# Patient Record
Sex: FEMALE | Race: WHITE
[De-identification: names, ages, dates, MRNs, and addresses within clinical notes are randomized per-mention and may not be internally consistent; named-entity substitution may affect disease eponyms.]

---

## 2020-02-11 ENCOUNTER — HOSPITAL ENCOUNTER (EMERGENCY)
Dept: HOSPITAL 25 - ED | Age: 50
Discharge: HOME | End: 2020-02-11
Payer: SELF-PAY

## 2020-02-11 VITALS — SYSTOLIC BLOOD PRESSURE: 108 MMHG | DIASTOLIC BLOOD PRESSURE: 61 MMHG

## 2020-02-11 DIAGNOSIS — R51: Primary | ICD-10-CM

## 2020-02-11 DIAGNOSIS — I48.91: ICD-10-CM

## 2020-02-11 DIAGNOSIS — R07.9: ICD-10-CM

## 2020-02-11 DIAGNOSIS — R20.0: ICD-10-CM

## 2020-02-11 LAB
ALBUMIN SERPL BCG-MCNC: 4.3 G/DL (ref 3.2–5.2)
ALBUMIN/GLOB SERPL: 1.7 {RATIO} (ref 1–3)
ALP SERPL-CCNC: 53 U/L (ref 34–104)
ALT SERPL W P-5'-P-CCNC: 12 U/L (ref 7–52)
ANION GAP SERPL CALC-SCNC: 5 MMOL/L (ref 2–11)
AST SERPL-CCNC: 16 U/L (ref 13–39)
BASOPHILS # BLD AUTO: 0.1 10^3/UL (ref 0–0.2)
BUN SERPL-MCNC: 19 MG/DL (ref 6–24)
BUN/CREAT SERPL: 21.8 (ref 8–20)
CALCIUM SERPL-MCNC: 9.8 MG/DL (ref 8.6–10.3)
CHLORIDE SERPL-SCNC: 107 MMOL/L (ref 101–111)
EOSINOPHIL # BLD AUTO: 0.2 10^3/UL (ref 0–0.6)
GLOBULIN SER CALC-MCNC: 2.5 G/DL (ref 2–4)
GLUCOSE SERPL-MCNC: 91 MG/DL (ref 70–100)
HCO3 SERPL-SCNC: 28 MMOL/L (ref 22–32)
HCT VFR BLD AUTO: 41 % (ref 35–47)
HGB BLD-MCNC: 14.2 G/DL (ref 12–16)
INR PPP/BLD: 0.99 (ref 0.82–1.09)
LYMPHOCYTES # BLD AUTO: 3.2 10^3/UL (ref 1–4.8)
MCH RBC QN AUTO: 30 PG (ref 27–31)
MCHC RBC AUTO-ENTMCNC: 35 G/DL (ref 31–36)
MCV RBC AUTO: 86 FL (ref 80–97)
MONOCYTES # BLD AUTO: 0.6 10^3/UL (ref 0–0.8)
NEUTROPHILS # BLD AUTO: 4.4 10^3/UL (ref 1.5–7.7)
NRBC # BLD AUTO: 0 10^3/UL
NRBC BLD QL AUTO: 0.1
PLATELET # BLD AUTO: 322 10^3/UL (ref 150–450)
POTASSIUM SERPL-SCNC: 3.9 MMOL/L (ref 3.5–5)
PROT SERPL-MCNC: 6.8 G/DL (ref 6.4–8.9)
RBC # BLD AUTO: 4.74 10^6 /UL (ref 3.7–4.87)
SODIUM SERPL-SCNC: 140 MMOL/L (ref 135–145)
TROPONIN I SERPL-MCNC: 0 NG/ML (ref ?–0.03)
WBC # BLD AUTO: 8.5 10^3/UL (ref 3.5–10.8)

## 2020-02-11 PROCEDURE — 84484 ASSAY OF TROPONIN QUANT: CPT

## 2020-02-11 PROCEDURE — 83605 ASSAY OF LACTIC ACID: CPT

## 2020-02-11 PROCEDURE — G0480 DRUG TEST DEF 1-7 CLASSES: HCPCS

## 2020-02-11 PROCEDURE — 80320 DRUG SCREEN QUANTALCOHOLS: CPT

## 2020-02-11 PROCEDURE — 85610 PROTHROMBIN TIME: CPT

## 2020-02-11 PROCEDURE — 93005 ELECTROCARDIOGRAM TRACING: CPT

## 2020-02-11 PROCEDURE — 36415 COLL VENOUS BLD VENIPUNCTURE: CPT

## 2020-02-11 PROCEDURE — 70450 CT HEAD/BRAIN W/O DYE: CPT

## 2020-02-11 PROCEDURE — 80053 COMPREHEN METABOLIC PANEL: CPT

## 2020-02-11 PROCEDURE — 99282 EMERGENCY DEPT VISIT SF MDM: CPT

## 2020-02-11 PROCEDURE — 85025 COMPLETE CBC W/AUTO DIFF WBC: CPT

## 2020-02-11 NOTE — ED
Complex/Multi-Sys Presentation





- HPI Summary


HPI Summary: 


50 year old F presenting to [facility] alone complains of left sided facial numbness

, pain of the left arm and throat, and headaches for the past 5 days. Patient 

reports she has a PMHx of a small hole in her heart and irregular heartbeat 

that is possibly atrial fibrillation. No SHx noted. FHx of her father having 

heart problems. SocHx of marijuana use. No SHx of smoking. The patient rates 

the pain 3/10 in severity. Symptoms aggravated by nothing. Symptoms alleviated 

by nothing.








- History Of Current Complaint


Chief Complaint: EDGeneral


Time Seen by Provider: 02/11/20 19:36


Hx Obtained From: Patient


Onset/Duration: Lasting Days, Still Present


Aggravating Factor(s): nothing


Alleviating Factor(s): nothing


Associated Signs And Symptoms: Positive: Headache, Other - left sided facial 

numbness and pain in the left arm and throat





- Allergies/Home Medications


Allergies/Adverse Reactions: 


 Allergies











Allergy/AdvReac Type Severity Reaction Status Date / Time


 


No Known Allergies Allergy   Verified 02/13/20 09:52














PMH/Surg Hx/FS Hx/Imm Hx


Cardiovascular History: Reports: Hx Atrial Fibrillation - History of "irregular 

heartbeat" that wasn't formally diagnosed as A-fib


   Denies: Hx Pacemaker/ICD


Sensory History: 


   Denies: Hx Hearing Aid


Psychiatric History: 


   Denies: Hx Panic Disorder





- Surgical History


Surgery Procedure, Year, and Place: TONSILECTOMY


Infectious Disease History: No


Infectious Disease History: 


   Denies: Traveled Outside the [place] in Last 30 Days





- Family History


Known Family History: Positive: Cardiac Disease





- Social History


Alcohol Use: None


Substance Use Type: Reports: Marijuana


Smoking Status (MU): Never Smoked Tobacco





Review of Systems


Positive: Other - pain in left arm and throat 


Positive: Headache, Numbness - left side of face 


All Other Systems Reviewed And Are Negative: Yes





Physical Exam





- Summary


Physical Exam Summary: 


VITAL SIGNS: Reviewed.


GENERAL: Patient is a well-developed and nourished female who is lying 

comfortable in the stretcher. Patient is not in any acute respiratory distress.


HEAD AND FACE: No signs of trauma. No ecchymosis, hematomas or skull 

depressions. No sinus tenderness.


EYES: PERRLA, EOMI x 2, No injected conjunctiva, no nystagmus.


EARS: Hearing grossly intact. Ear canals and tympanic membranes are within 

normal limits.


MOUTH: Oropharynx within normal limits.


NECK: Supple, trachea is midline, no adenopathy, no JVD, no carotid bruit, no c-

spine tenderness, neck with full ROM.


CHEST: Symmetric, no tenderness at palpation.


LUNGS: Clear to auscultation bilaterally. No wheezing or crackles.


CVS: Regular rate and rhythm, S1 and S2 present, no murmurs or gallops 

appreciated.


ABDOMEN: Soft, non-tender. No signs of distention. No rebound, no guarding, and 

no masses palpated. Bowel sounds are normal.


EXTREMITIES: FROM in all major joints, no edema, no cyanosis or clubbing.


NEURO: Alert and oriented x 3. No acute neurological deficits. Speech is normal 

and follows commands. GCS 15. 


SKIN: Dry and warm.








Triage Information Reviewed: Yes


Vital Signs On Initial Exam: 


 Initial Vitals











Temp Pulse Resp BP Pulse Ox


 


 98.5 F   90   18   126/91   98 


 


 02/11/20 19:32  02/11/20 19:32  02/11/20 19:32  02/11/20 19:32  02/11/20 19:32











Vital Signs Reviewed: Yes





- Robby Coma Scale


Best Eye Response: 4 - Spontaneous


Best Motor Response: 6 - Obeys Commands


Best Verbal Response: 5 - Oriented


Coma Scale Total: 15





Procedures





- Sedation


Patient Received Moderate/Deep Sedation with Procedure: No





Diagnostics





- Vital Signs


 Vital Signs











  Temp Pulse Resp BP Pulse Ox


 


 02/11/20 19:32  98.5 F  90  18  126/91  98














- Laboratory


Lab Results: 


 Lab Results











  02/11/20 02/11/20 02/11/20 Range/Units





  20:46 20:46 20:46 


 


WBC  8.5    (3.5-10.8)  10^3/uL


 


RBC  4.74    (3.70-4.87)  10^6 /uL


 


Hgb  14.2    (12.0-16.0)  g/dL


 


Hct  41    (35-47)  %


 


MCV  86    (80-97)  fL


 


MCH  30    (27-31)  pg


 


MCHC  35    (31-36)  g/dL


 


RDW  14    (10-15)  %


 


Plt Count  322    (150-450)  10^3/uL


 


MPV  7.9    (7.4-10.4)  fL


 


Neut % (Auto)  51.5    %


 


Lymph % (Auto)  37.8    %


 


Mono % (Auto)  7.4    %


 


Eos % (Auto)  2.4    %


 


Baso % (Auto)  0.9    %


 


Absolute Neuts (auto)  4.4    (1.5-7.7)  10^3/ul


 


Absolute Lymphs (auto)  3.2    (1.0-4.8)  10^3/ul


 


Absolute Monos (auto)  0.6    (0-0.8)  10^3/ul


 


Absolute Eos (auto)  0.2    (0-0.6)  10^3/ul


 


Absolute Basos (auto)  0.1    (0-0.2)  10^3/ul


 


Absolute Nucleated RBC  0.0    10^3/ul


 


Nucleated RBC %  0.1    


 


INR (Anticoag Therapy)   0.99   (0.82-1.09)  


 


Sodium    140  (135-145)  mmol/L


 


Potassium    3.9  (3.5-5.0)  mmol/L


 


Chloride    107  (101-111)  mmol/L


 


Carbon Dioxide    28  (22-32)  mmol/L


 


Anion Gap    5  (2-11)  mmol/L


 


BUN    19  (6-24)  mg/dL


 


Creatinine    0.87  (0.51-0.95)  mg/dL


 


Est GFR ( Amer)    82.7  (>60)  


 


Est GFR (Non-Af Amer)    68.4  (>60)  


 


BUN/Creatinine Ratio    21.8 H  (8-20)  


 


Glucose    91  ()  mg/dL


 


Lactic Acid     (0.5-2.0)  mmol/L


 


Calcium    9.8  (8.6-10.3)  mg/dL


 


Total Bilirubin    0.30  (0.2-1.0)  mg/dL


 


AST    16  (13-39)  U/L


 


ALT    12  (7-52)  U/L


 


Alkaline Phosphatase    53  ()  U/L


 


Troponin I    0.00  (<0.03)  ng/mL


 


Total Protein    6.8  (6.4-8.9)  g/dL


 


Albumin    4.3  (3.2-5.2)  g/dL


 


Globulin    2.5  (2-4)  g/dL


 


Albumin/Globulin Ratio    1.7  (1-3)  


 


Serum Alcohol    < 10  (<10)  mg/dL














  02/11/20 Range/Units





  20:46 


 


WBC   (3.5-10.8)  10^3/uL


 


RBC   (3.70-4.87)  10^6 /uL


 


Hgb   (12.0-16.0)  g/dL


 


Hct   (35-47)  %


 


MCV   (80-97)  fL


 


MCH   (27-31)  pg


 


MCHC   (31-36)  g/dL


 


RDW   (10-15)  %


 


Plt Count   (150-450)  10^3/uL


 


MPV   (7.4-10.4)  fL


 


Neut % (Auto)   %


 


Lymph % (Auto)   %


 


Mono % (Auto)   %


 


Eos % (Auto)   %


 


Baso % (Auto)   %


 


Absolute Neuts (auto)   (1.5-7.7)  10^3/ul


 


Absolute Lymphs (auto)   (1.0-4.8)  10^3/ul


 


Absolute Monos (auto)   (0-0.8)  10^3/ul


 


Absolute Eos (auto)   (0-0.6)  10^3/ul


 


Absolute Basos (auto)   (0-0.2)  10^3/ul


 


Absolute Nucleated RBC   10^3/ul


 


Nucleated RBC %   


 


INR (Anticoag Therapy)   (0.82-1.09)  


 


Sodium   (135-145)  mmol/L


 


Potassium   (3.5-5.0)  mmol/L


 


Chloride   (101-111)  mmol/L


 


Carbon Dioxide   (22-32)  mmol/L


 


Anion Gap   (2-11)  mmol/L


 


BUN   (6-24)  mg/dL


 


Creatinine   (0.51-0.95)  mg/dL


 


Est GFR ( Amer)   (>60)  


 


Est GFR (Non-Af Amer)   (>60)  


 


BUN/Creatinine Ratio   (8-20)  


 


Glucose   ()  mg/dL


 


Lactic Acid  0.8  (0.5-2.0)  mmol/L


 


Calcium   (8.6-10.3)  mg/dL


 


Total Bilirubin   (0.2-1.0)  mg/dL


 


AST   (13-39)  U/L


 


ALT   (7-52)  U/L


 


Alkaline Phosphatase   ()  U/L


 


Troponin I   (<0.03)  ng/mL


 


Total Protein   (6.4-8.9)  g/dL


 


Albumin   (3.2-5.2)  g/dL


 


Globulin   (2-4)  g/dL


 


Albumin/Globulin Ratio   (1-3)  


 


Serum Alcohol   (<10)  mg/dL











Result Diagrams: 


 02/11/20 20:46





 02/11/20 20:46


Lab Statement: Any lab studies that have been ordered have been reviewed, and 

results considered in the medical decision making process.





- CT


  ** CT Brain


CT Interpretation Completed By: Radiologist


Summary of CT Findings: IMPRESSION:  No acute intracranial findings.   

has reviewed this report.





- EKG


  ** 1940


Cardiac Rate: NL


EKG Rhythm: Sinus Rhythm


Summary of EKG Findings: EKG at 1940 reveals sinus rythm at 72 bpm with no ST 

elevation.  has reviewed and interpreted this report.





Complex Multi-Symp Course/Dx


Assessment/Plan: 50 year old F presenting to [facility] alone complains of left 

sided facial numbness, pain of the left arm and throat, and headaches for the 

past 5 days. Patient reports she has a PMHx of a small hole in her heart and 

irregular heartbeat that is possibly atrial fibrillation. No SHx noted. FHx of 

her father having heart problems.  Patient neurological exam is found to be 

within normal limits.  Patient doesnt have any acute neurological focal 

deficits. GCS 15.  Patient reports that shes been having the symptoms for the 

last 5 days intermittently.  The patient is asymptomatic in the ED.  Blood test 

results without any significant abnormality except BUN/Creatinine ratio 21.8.  

Head CT impression: No acute intracranial findings.  Heart Score is 2.  EKG at 

1940 reveals sinus rythm at 72 bpm with no ST elevation.  At this point, I 

discussed all the findings and test results with the patient. Patient  was 

instructed to return to the emergency room immediately if any of the symptoms 

return or worsen.Plan of care was discussed with the patient and the patient 

understands and agrees. All questions were answered at patient satisfaction.   

Patient understands and agrees. Neurological exam before discharge: Patient is 

alert and oriented x 3. No acute neurological deficits. Patient's vital signs 

are stable. Patient is to follow up with [facility] in the next 2  3 days. They 

understand and agree. The plan of care was discussed with the patient and the 

patient understands and agrees with the plan of care.  All questions were 

answered at patient satisfaction.  There were no further complaints or concerns.





- Diagnoses


Provider Diagnoses: 


 Headache, Chest pain








Discharge ED





- Sign-Out/Discharge


Documenting (check all that apply): Patient Departure - discharge 





- Discharge Plan


Condition: Stable


Disposition: HOME


Patient Education Materials:  Chest Pain (ED), Acute Headache (ED)


Referrals: 


George Serna, NP [Primary Care Provider] - 


Additional Instructions: 


FOLLOW UP WITH YOUR PRIMARY CARE PROVIDER WITHIN ONE WEEK. RETURN TO THE ED FOR 

ANY WORSENING OR NEW SYMPTOMS. 








- Billing Disposition and Condition


Condition: STABLE


Disposition: Home





- Attestation Statements


Document Initiated by Chuibe: Yes


Documenting Scribe: Kwadwo Leslie


Provider For Whom Chuibracquel is Documenting (Include Credential): Dr.Walter Adeel MD


Scribe Attestation: 


IKwadwo, scribed for Dr.Walter Adeel MD on 02/13/20 at 2037. 


Scribe Documentation Reviewed: Yes


Provider Attestation: 


The documentation as recorded by the Kwadwo piedra accurately 

reflects the service I personally performed and the decisions made by me, 

Dr.Walter Adeel MD


Status of Scribe Document: Viewed